# Patient Record
Sex: MALE | Race: WHITE | NOT HISPANIC OR LATINO | Employment: FULL TIME | URBAN - METROPOLITAN AREA
[De-identification: names, ages, dates, MRNs, and addresses within clinical notes are randomized per-mention and may not be internally consistent; named-entity substitution may affect disease eponyms.]

---

## 2019-07-30 ENCOUNTER — OFFICE VISIT (OUTPATIENT)
Dept: FAMILY MEDICINE CLINIC | Facility: CLINIC | Age: 24
End: 2019-07-30

## 2019-07-30 VITALS
WEIGHT: 168 LBS | OXYGEN SATURATION: 98 % | DIASTOLIC BLOOD PRESSURE: 78 MMHG | HEIGHT: 70 IN | BODY MASS INDEX: 24.05 KG/M2 | SYSTOLIC BLOOD PRESSURE: 116 MMHG | HEART RATE: 57 BPM

## 2019-07-30 DIAGNOSIS — L30.9 DERMATITIS: Primary | ICD-10-CM

## 2019-07-30 PROCEDURE — 99213 OFFICE O/P EST LOW 20 MIN: CPT | Performed by: FAMILY MEDICINE

## 2019-07-30 NOTE — PROGRESS NOTES
"  Chief Complaint   Patient presents with   • Rash     Woke up yesterday with the right side of his face feeling itchy, red and swelling. Better today        Subjective   Juliet Trent is a 24 y.o. male.     History of Present Illness   PT is here for a day hx or rash.  He woke up yesterday with rash on his chin and rt side of face.  It is a little better today.  It is itchy but no real pain.  He took zyrtec for it yesterday.  No swelling of lip.  No real outside exposure for past few days.    The following portions of the patient's history were reviewed and updated as appropriate: allergies, current medications, past family history, past medical history, past social history, past surgical history and problem list.    Review of Systems   Constitutional: Negative for fatigue.   Respiratory: Negative for cough, chest tightness and shortness of breath.    Cardiovascular: Negative for chest pain, palpitations and leg swelling.   Skin: Positive for color change and skin lesions.   Neurological: Negative for headache.       Patient Active Problem List   Diagnosis   • Dermatitis       No Known Allergies    No current outpatient medications on file.    History reviewed. No pertinent past medical history.    History reviewed. No pertinent surgical history.    History reviewed. No pertinent family history.    Social History     Tobacco Use   • Smoking status: Current Some Day Smoker     Types: Cigarettes   • Smokeless tobacco: Never Used   Substance Use Topics   • Alcohol use: Yes     Comment: Rarely            Objective     Visit Vitals  /78   Pulse 57   Ht 177.8 cm (70\")   Wt 76.2 kg (168 lb)   SpO2 98%   BMI 24.11 kg/m²       Physical Exam   Constitutional: He appears well-developed. No distress.   Eyes: Conjunctivae and lids are normal.   Neck: Trachea normal. Carotid bruit is not present. No thyroid mass and no thyromegaly present.   Cardiovascular: Normal rate, regular rhythm and normal heart sounds. "   Pulmonary/Chest: Effort normal and breath sounds normal.   Lymphadenopathy:     He has no cervical adenopathy.   Neurological: He is alert. He is not disoriented.   Skin: Skin is warm and dry. Rash noted.   Psychiatric: He has a normal mood and affect. His speech is normal and behavior is normal. He is attentive.           Procedures    Assessment/Plan   Problems Addressed this Visit        Musculoskeletal and Integument    Dermatitis - Primary          No orders of the defined types were placed in this encounter.    Will use cortisone 10 and zyrtec and if not better then will call in dose david.    No current outpatient medications on file.     No current facility-administered medications for this visit.        No Follow-up on file.    There are no Patient Instructions on file for this visit.

## 2019-08-26 ENCOUNTER — OFFICE VISIT (OUTPATIENT)
Dept: RETAIL CLINIC | Facility: CLINIC | Age: 24
End: 2019-08-26

## 2019-08-26 VITALS
SYSTOLIC BLOOD PRESSURE: 122 MMHG | DIASTOLIC BLOOD PRESSURE: 74 MMHG | OXYGEN SATURATION: 96 % | RESPIRATION RATE: 20 BRPM | HEART RATE: 102 BPM | TEMPERATURE: 99.8 F

## 2019-08-26 DIAGNOSIS — J10.1 INFLUENZA A: Primary | ICD-10-CM

## 2019-08-26 LAB
EXPIRATION DATE: ABNORMAL
FLUAV AG NPH QL: POSITIVE
FLUBV AG NPH QL: NEGATIVE
INTERNAL CONTROL: ABNORMAL
Lab: ABNORMAL

## 2019-08-26 PROCEDURE — 87804 INFLUENZA ASSAY W/OPTIC: CPT | Performed by: NURSE PRACTITIONER

## 2019-08-26 PROCEDURE — 99213 OFFICE O/P EST LOW 20 MIN: CPT | Performed by: NURSE PRACTITIONER

## 2019-08-26 RX ORDER — OSELTAMIVIR PHOSPHATE 75 MG/1
75 CAPSULE ORAL 2 TIMES DAILY
Qty: 10 CAPSULE | Refills: 0 | Status: SHIPPED | OUTPATIENT
Start: 2019-08-26 | End: 2019-08-31

## 2019-08-26 NOTE — PROGRESS NOTES
Subjective   Juliet Trent is a 24 y.o. male.     Flu Symptoms   This is a new problem. The current episode started today (pain started today when he woke up). The problem occurs constantly. The problem has been gradually worsening. Associated symptoms include congestion, coughing, fatigue, a fever, myalgias, a sore throat and swollen glands. Pertinent negatives include no nausea, numbness, vomiting or weakness. Treatments tried: mucinex. The treatment provided mild relief.        The following portions of the patient's history were reviewed and updated as appropriate: allergies, current medications, past family history, past medical history, past social history, past surgical history and problem list.    Review of Systems   Constitutional: Positive for fatigue and fever.   HENT: Positive for congestion, sore throat and swollen glands.    Respiratory: Positive for cough.    Cardiovascular: Negative.    Gastrointestinal: Negative for nausea and vomiting.   Genitourinary: Negative.    Musculoskeletal: Positive for myalgias.   Neurological: Negative for weakness and numbness.       Objective   Physical Exam   Constitutional: He is cooperative. He has a sickly appearance. No distress.   HENT:   Head: Normocephalic.   Right Ear: Hearing, external ear and ear canal normal. A middle ear effusion is present.   Left Ear: Hearing, external ear and ear canal normal. A middle ear effusion is present.   Nose: Mucosal edema, rhinorrhea, sinus tenderness and congestion present. Right sinus exhibits frontal sinus tenderness. Left sinus exhibits frontal sinus tenderness.   Mouth/Throat: Oropharynx is clear and moist.   Eyes: Conjunctivae, EOM and lids are normal. Pupils are equal, round, and reactive to light.   Neck: Trachea normal and full passive range of motion without pain.   Cardiovascular: Normal rate, regular rhythm and normal pulses.   Pulmonary/Chest: Effort normal and breath sounds normal.   Lymphadenopathy:        Head  (right side): Posterior auricular adenopathy present. No preauricular adenopathy present.     He has cervical adenopathy.        Right cervical: Superficial cervical adenopathy present.        Left cervical: Superficial cervical adenopathy present.   Neurological: He is alert.   Skin: Skin is warm. Capillary refill takes less than 2 seconds.   Psychiatric: He has a normal mood and affect. His speech is normal and behavior is normal.   Vitals reviewed.        Assessment/Plan   Juliet was seen today for flu symptoms.    Diagnoses and all orders for this visit:    Influenza A  -     POC Influenza A / B    Other orders  -     oseltamivir (TAMIFLU) 75 MG capsule; Take 1 capsule by mouth 2 (Two) Times a Day for 5 days.      Patient instructed to rest, plenty of fluids, if symptoms worsen follow up with pcp or ED if unable to keep any fluid down or no urine output. He verbalized understanding

## 2019-08-26 NOTE — PATIENT INSTRUCTIONS
Influenza, Adult  Influenza, more commonly known as “the flu,” is a viral infection that mainly affects the respiratory tract. The respiratory tract includes organs that help you breathe, such as the lungs, nose, and throat. The flu causes many symptoms of the common cold, as well as a high fever and body aches.  The flu spreads easily from person to person (is contagious). Getting a flu shot (influenza vaccination) every year is the best way to prevent the flu.  What are the causes?  This condition is caused by the influenza virus. You can get the virus by:  · Breathing in droplets from an infected person's cough or sneeze.  · Touching something that has been exposed to the virus (has been contaminated) and then touching your mouth, nose, or eyes.  What increases the risk?  The following factors may make you more likely to get the flu:  · Not cleaning your hands often with soap and water or alcohol-based hand .  · Having close contact with many people during cold and flu season.  · Touching your mouth, eyes, or nose without first washing or sanitizing your hands.  · Not getting a yearly (annual) flu shot.  You may have a higher risk for the flu, including serious problems, such as a lung infection (pneumonia), if you:  · Are older than 65.  · Are pregnant.  · Have a weakened disease-fighting system (immune system). You may have a weakened immune system if you:  ? Have HIV or AIDS.  ? Are undergoing chemotherapy.  ? Are taking medicines that reduce (suppress) the activity of your immune system.  · Have a long-term (chronic) illness, such as heart disease, kidney disease, diabetes, or lung disease.  · Have a liver disorder.  · Are severely overweight (morbidly obese).  · Have anemia. This is a condition that affects your red blood cells.  · Have asthma.  What are the signs or symptoms?  Symptoms of this condition usually last 4-14 days and may include:  · Fever and chills.  · Headaches, body aches, or muscle  aches.  · Sore throat.  · Cough.  · Runny or stuffy (congested) nose.  · Chest discomfort.  · Poor appetite.  · Weakness or fatigue.  · Dizziness.  · Nausea or vomiting.  How is this diagnosed?  This condition may be diagnosed based on:  · Your symptoms and medical history.  · A physical exam.  · Swabbing your nose or throat and testing the fluid for the influenza virus.  How is this treated?  If the flu is diagnosed early, you can be treated with medicine that can help reduce how severe the illness is and how long it lasts (antiviral medicine). This may be given by mouth (orally) or through an IV.  Taking care of yourself at home can help relieve symptoms. Your health care provider may recommend:  · Taking over-the-counter medicines.  · Drinking enough fluid to keep your urine pale yellow.  In many cases, the flu goes away on its own. If you have severe symptoms or complications, you may be treated in a hospital.  Follow these instructions at home:  Activity  · Rest as needed and get plenty of sleep.  · Stay home from work or school as told by your health care provider. Unless you are visiting your health care provider, avoid leaving home until your symptoms have been gone for 24 hours without taking medicine.  Eating and drinking  · Take an oral rehydration solution (ORS). This is a drink that is sold at pharmacies and retail stores.  · Drink clear fluids in small amounts as you are able. Clear fluids include water, ice chips, diluted fruit juice, and low-calorie sports drinks.  · Eat bland, easy-to-digest foods in small amounts as you are able. These foods include bananas, applesauce, rice, lean meats, toast, and crackers.  · Avoid drinking fluids that contain a lot of sugar or caffeine, such as energy drinks, sports drinks, and soda.  · Avoid alcohol.  · Avoid spicy or fatty foods.  General instructions    · Take over-the-counter and prescription medicines only as told by your health care provider.  · Use a cool  mist humidifier to add humidity to the air in your home. This can make it easier to breathe.  · Cover your mouth and nose when you cough or sneeze.  · Wash your hands with soap and water often, especially after you cough or sneeze. If soap and water are not available, use alcohol-based hand .  · Keep all follow-up visits as told by your health care provider. This is important.  How is this prevented?    · Get an annual flu shot. You may get the flu shot in late summer, fall, or winter. Ask your health care provider when you should get your flu shot.  · Avoid contact with people who are sick during cold and flu season. This is generally fall and winter.  Contact a health care provider if:  · You develop new symptoms.  · You have:  ? Chest pain.  ? Diarrhea.  ? A fever.  · Your cough gets worse.  · You produce more mucus.  · You feel nauseous or you vomit.  Get help right away if:  · You develop shortness of breath or difficulty breathing.  · Your skin or nails turn a bluish color.  · You have severe pain or stiffness in your neck.  · You develop a sudden headache or sudden pain in your face or ear.  · You cannot eat or drink without vomiting.  Summary  · Influenza, more commonly known as “the flu,” is a viral infection that primarily affects your respiratory tract.  · Symptoms of the flu usually last 4-14 days.  · You may get antiviral medicine for treatment.  · Getting an annual flu shot is the best way to prevent getting the flu.  This information is not intended to replace advice given to you by your health care provider. Make sure you discuss any questions you have with your health care provider.  Document Released: 12/15/2001 Document Revised: 01/14/2019 Document Reviewed: 01/14/2019  [employer] Patient Education © 2019 [employer]

## 2022-07-14 ENCOUNTER — HOSPITAL ENCOUNTER (EMERGENCY)
Facility: HOSPITAL | Age: 27
Discharge: HOME | End: 2022-07-14
Attending: EMERGENCY MEDICINE | Admitting: EMERGENCY MEDICINE

## 2022-07-14 VITALS
TEMPERATURE: 98.2 F | RESPIRATION RATE: 18 BRPM | SYSTOLIC BLOOD PRESSURE: 153 MMHG | WEIGHT: 170 LBS | BODY MASS INDEX: 24.34 KG/M2 | DIASTOLIC BLOOD PRESSURE: 96 MMHG | HEIGHT: 70 IN | OXYGEN SATURATION: 97 % | HEART RATE: 74 BPM

## 2022-07-14 DIAGNOSIS — S01.511A LACERATION OF INTRAORAL SURFACE OF LIP, INITIAL ENCOUNTER: Primary | ICD-10-CM

## 2022-07-14 PROCEDURE — 99282 EMERGENCY DEPT VISIT SF MDM: CPT

## 2022-07-14 RX ORDER — LIDOCAINE HYDROCHLORIDE AND EPINEPHRINE 10; 10 MG/ML; UG/ML
20 INJECTION, SOLUTION INFILTRATION; PERINEURAL ONCE
Status: COMPLETED | OUTPATIENT
Start: 2022-07-14 | End: 2022-07-14

## 2022-07-14 RX ADMIN — LIDOCAINE HYDROCHLORIDE,EPINEPHRINE BITARTRATE 20 ML: 10; .01 INJECTION, SOLUTION INFILTRATION; PERINEURAL at 22:15

## 2022-07-15 NOTE — ED NOTES
Patient presents to ED with report of laceration to inside of upper lip. He was hit during a soccer game but it unsure what caused the laceration. He is unsure of his last tetanus shot.     Patient placed in mask upon arrival, triage staff wearing appropriate PPE.

## 2022-07-15 NOTE — ED PROVIDER NOTES
EMERGENCY DEPARTMENT ENCOUNTER    Room Number:  A03/03  Date of encounter:  7/14/2022  PCP: Lily Arora MD  Historian: Patient      HPI:  Chief Complaint: Laceration   A complete HPI/ROS/PMH/PSH/SH/FH are unobtainable due to: N/A    Context: Juliet Trent is a 27 y.o. male who presents to the ED c/o lip laceration.  Patient was playing soccer when he excellently got hit in the face causing a laceration to the inside of his upper lip.  Patient denies any dental injury, malocclusion, loose teeth, head injury, loss of consciousness, or other complaints.      PAST MEDICAL HISTORY  Active Ambulatory Problems     Diagnosis Date Noted   • Dermatitis 07/30/2019     Resolved Ambulatory Problems     Diagnosis Date Noted   • No Resolved Ambulatory Problems     No Additional Past Medical History         PAST SURGICAL HISTORY  No past surgical history on file.      FAMILY HISTORY  Family History   Problem Relation Age of Onset   • Irritable bowel syndrome Mother    • Irritable bowel syndrome Sister          SOCIAL HISTORY  Social History     Socioeconomic History   • Marital status: Single   Tobacco Use   • Smoking status: Current Some Day Smoker     Types: Cigarettes   • Smokeless tobacco: Never Used   Substance and Sexual Activity   • Alcohol use: Yes     Comment: Rarely   • Drug use: Yes     Types: Marijuana         ALLERGIES  Patient has no known allergies.        REVIEW OF SYSTEMS  Review of Systems     All systems reviewed and negative except for those discussed in HPI.       PHYSICAL EXAM    I have reviewed the triage vital signs and nursing notes.    ED Triage Vitals   Temp Heart Rate Resp BP SpO2   07/14/22 2148 07/14/22 2148 07/14/22 2148 07/14/22 2150 07/14/22 2148   98.2 °F (36.8 °C) 81 16 153/96 97 %      Temp src Heart Rate Source Patient Position BP Location FiO2 (%)   07/14/22 2148 -- -- -- --   Tympanic           Physical Exam  GENERAL: Alert and orient x3, not distressed  HENT: no hemotympanum, head  atraumatic/normocephalic, there is a 1.5 cm vertical laceration to the inside of the upper lip just right of midline  EYES: no scleral icterus, PERRL, EOMI  CV: regular rhythm, regular rate  RESPIRATORY: normal effort  MUSCULOSKELETAL: no deformity  NEURO: alert, moves all extremities, follows commands  SKIN: warm, dry, intact        LAB RESULTS  No results found for this or any previous visit (from the past 24 hour(s)).    Ordered the above labs and independently reviewed the results.        RADIOLOGY  No Radiology Exams Resulted Within Past 24 Hours    I ordered the above noted radiological studies. Reviewed by me and discussed with radiologist.  See dictation for official radiology interpretation.      PROCEDURES    Laceration Repair    Date/Time: 7/14/2022 10:25 PM  Performed by: Bernardo Pink PA  Authorized by: Aamir Byrd MD     Consent:     Consent obtained:  Verbal    Consent given by:  Patient    Risks discussed:  Infection and pain  Universal protocol:     Procedure explained and questions answered to patient or proxy's satisfaction: yes      Patient identity confirmed:  Verbally with patient  Anesthesia:     Anesthesia method:  Local infiltration    Local anesthetic:  Lidocaine 1% WITH epi  Laceration details:     Location:  Lip    Lip location:  Upper interior lip    Length (cm):  1.5  Pre-procedure details:     Preparation:  Patient was prepped and draped in usual sterile fashion  Exploration:     Hemostasis achieved with:  Epinephrine    Wound extent: no fascia violation noted, no foreign bodies/material noted, no muscle damage noted, no tendon damage noted, no underlying fracture noted and no vascular damage noted    Treatment:     Area cleansed with:  Povidone-iodine    Amount of cleaning:  Extensive    Irrigation solution:  Sterile saline  Skin repair:     Repair method:  Sutures    Suture size:  5-0    Suture material:  Chromic gut    Suture technique:  Simple interrupted    Number of  sutures:  4  Approximation:     Approximation:  Close  Repair type:     Repair type:  Simple  Post-procedure details:     Dressing:  Open (no dressing)    Procedure completion:  Tolerated well, no immediate complications          MEDICATIONS GIVEN IN ER    Medications   lidocaine 1% - EPINEPHrine [id number] (XYLOCAINE W/EPI) 1 %-1:242932 injection 20 mL (has no administration in time range)         PROGRESS, DATA ANALYSIS, CONSULTS, AND MEDICAL DECISION MAKING    All labs have been independently reviewed by me.  All radiology studies have been reviewed by me and discussed with radiologist dictating the report.   EKG's independently viewed and interpreted by me.  Discussion below represents my analysis of pertinent findings related to patient's condition, differential diagnosis, treatment plan and final disposition.    DDx: Includes but not limited to laceration         MDM: Patient's laceration has been repaired, wound care precautions given, vital signs are stable, patient is safe for discharge home.    PPE: The patient wore a surgical mask throughout the entire patient encounter. I wore an N95.    AS OF 22:28 EDT VITALS:    BP - 153/96  HR - 74  TEMP - 98.2 °F (36.8 °C) (Tympanic)  O2 SATS - 97%        DIAGNOSIS  Final diagnoses:   Laceration of intraoral surface of lip, initial encounter         DISPOSITION  DISCHARGE    Patient discharged in stable condition.    Reviewed implications of results, diagnosis, meds, responsibility to follow up, warning signs and symptoms of possible worsening, potential complications and reasons to return to ER.    Patient/Family voiced understanding of above instructions.    Discussed plan for discharge, as there is no emergent indication for admission. Patient referred to primary care provider for BP management due to today's BP. Pt/family is agreeable and understands need for follow up and repeat testing.  Pt is aware that discharge does not mean that nothing is wrong but it  indicates no emergency is present that requires admission and they must continue care with follow-up as given below or physician of their choice.     FOLLOW-UP  Lily Arora MD  [id number] [id number]  [phone]    Schedule an appointment as soon as possible for a visit   For wound re-check         Medication List      No changes were made to your prescriptions during this visit.                  Bernardo Pink PA  07/14/22 2227       Bernardo Pink PA  07/14/22 2228

## 2022-07-15 NOTE — DISCHARGE INSTRUCTIONS
Keep laceration clean and dry, rinse mouth after eating, watch carefully for signs of infection, sutures should dissolve in 5-7 days. Return to care if any complications.

## 2022-07-15 NOTE — ED TRIAGE NOTES
Pt was playing soccer today when he collided with someone else. Pt c/o laceration to interior upper lip. Laceration present. Bleeding controlled    This RN wore mask and goggles during time of contact

## 2022-07-15 NOTE — ED PROVIDER NOTES
MD ATTESTATION NOTE    The SHAYLEE and I have discussed this patient's history, physical exam, and treatment plan.  I have reviewed the documentation and personally had a face to face interaction with the patient. I affirm the documentation and agree with the treatment and plan.  The attached note describes my personal findings.      I provided a substantive portion of the care of the patient.  I personally performed the physical exam in its entirety, and below are my findings.  For this patient encounter, the patient wore surgical mask, I wore full protective PPE including N95 and eye protection    Brief HPI: 19-year-old male who presents to the ER for upper inner lip laceration that happened while playing soccer tonight.  The patient denies loss of consciousness, jaw pain or malocclusion.  He denies dental injury.    General : 27-year-old patient is awake alert and oriented  HEENT: NCAT: The patient has no jaw tenderness and good occlusion without pain.  There is no dental injury.  The patient does have a significant upper lip laceration but it is not through and through.  CV: Heart is regular with no murmurs  Respiratory: CTA bilaterally  Abd: Soft and nontender  Ext: No acute abnormalities  Skin: No rash  Neuro: Cranial nerves II through XII grossly intact as tested.  No acute lateralizing deficits.  Psych: Normal mood and affect      Plan: We will repair the patient's lip laceration.  Bernardo Rutledge has repaired the patient's lip laceration and he is stable for discharge       Aamir Byrd MD  07/14/22 [id number]